# Patient Record
Sex: FEMALE | Race: ASIAN | NOT HISPANIC OR LATINO | URBAN - METROPOLITAN AREA
[De-identification: names, ages, dates, MRNs, and addresses within clinical notes are randomized per-mention and may not be internally consistent; named-entity substitution may affect disease eponyms.]

---

## 2020-08-15 ENCOUNTER — INPATIENT (INPATIENT)
Facility: HOSPITAL | Age: 35
LOS: 0 days | Discharge: HOME | End: 2020-08-16
Attending: OBSTETRICS & GYNECOLOGY | Admitting: OBSTETRICS & GYNECOLOGY

## 2020-08-15 ENCOUNTER — TRANSCRIPTION ENCOUNTER (OUTPATIENT)
Age: 35
End: 2020-08-15

## 2020-08-15 VITALS
SYSTOLIC BLOOD PRESSURE: 122 MMHG | TEMPERATURE: 99 F | DIASTOLIC BLOOD PRESSURE: 61 MMHG | HEART RATE: 86 BPM | RESPIRATION RATE: 15 BRPM

## 2020-08-15 DIAGNOSIS — O26.899 OTHER SPECIFIED PREGNANCY RELATED CONDITIONS, UNSPECIFIED TRIMESTER: ICD-10-CM

## 2020-08-15 DIAGNOSIS — Z3A.00 WEEKS OF GESTATION OF PREGNANCY NOT SPECIFIED: ICD-10-CM

## 2020-08-15 DIAGNOSIS — O42.90 PREMATURE RUPTURE OF MEMBRANES, UNSPECIFIED AS TO LENGTH OF TIME BETWEEN RUPTURE AND ONSET OF LABOR, UNSPECIFIED WEEKS OF GESTATION: ICD-10-CM

## 2020-08-15 DIAGNOSIS — A49.1 STREPTOCOCCAL INFECTION, UNSPECIFIED SITE: ICD-10-CM

## 2020-08-15 LAB
BASOPHILS # BLD AUTO: 0.04 K/UL — SIGNIFICANT CHANGE UP (ref 0–0.2)
BASOPHILS NFR BLD AUTO: 0.4 % — SIGNIFICANT CHANGE UP (ref 0–2)
BLD GP AB SCN SERPL QL: NEGATIVE — SIGNIFICANT CHANGE UP
EOSINOPHIL # BLD AUTO: 0.11 K/UL — SIGNIFICANT CHANGE UP (ref 0–0.5)
EOSINOPHIL NFR BLD AUTO: 1.1 % — SIGNIFICANT CHANGE UP (ref 0–6)
HBV SURFACE AG SER-ACNC: NEGATIVE — SIGNIFICANT CHANGE UP
HCT VFR BLD CALC: 31.7 % — LOW (ref 34.5–45)
HGB BLD-MCNC: 10.5 G/DL — LOW (ref 11.5–15.5)
IMM GRANULOCYTES NFR BLD AUTO: 0.5 % — SIGNIFICANT CHANGE UP (ref 0–1.5)
LYMPHOCYTES # BLD AUTO: 2 K/UL — SIGNIFICANT CHANGE UP (ref 1–3.3)
LYMPHOCYTES # BLD AUTO: 20.4 % — SIGNIFICANT CHANGE UP (ref 13–44)
MCHC RBC-ENTMCNC: 27.3 PG — SIGNIFICANT CHANGE UP (ref 27–34)
MCHC RBC-ENTMCNC: 33.1 % — SIGNIFICANT CHANGE UP (ref 32–36)
MCV RBC AUTO: 82.3 FL — SIGNIFICANT CHANGE UP (ref 80–100)
MONOCYTES # BLD AUTO: 0.74 K/UL — SIGNIFICANT CHANGE UP (ref 0–0.9)
MONOCYTES NFR BLD AUTO: 7.6 % — SIGNIFICANT CHANGE UP (ref 2–14)
NEUTROPHILS # BLD AUTO: 6.84 K/UL — SIGNIFICANT CHANGE UP (ref 1.8–7.4)
NEUTROPHILS NFR BLD AUTO: 70 % — SIGNIFICANT CHANGE UP (ref 43–77)
NRBC # FLD: 0 K/UL — SIGNIFICANT CHANGE UP (ref 0–0)
PLATELET # BLD AUTO: 237 K/UL — SIGNIFICANT CHANGE UP (ref 150–400)
PMV BLD: 12 FL — SIGNIFICANT CHANGE UP (ref 7–13)
RBC # BLD: 3.85 M/UL — SIGNIFICANT CHANGE UP (ref 3.8–5.2)
RBC # FLD: 13.6 % — SIGNIFICANT CHANGE UP (ref 10.3–14.5)
RH IG SCN BLD-IMP: POSITIVE — SIGNIFICANT CHANGE UP
RH IG SCN BLD-IMP: POSITIVE — SIGNIFICANT CHANGE UP
SARS-COV-2 RNA SPEC QL NAA+PROBE: SIGNIFICANT CHANGE UP
WBC # BLD: 9.78 K/UL — SIGNIFICANT CHANGE UP (ref 3.8–10.5)
WBC # FLD AUTO: 9.78 K/UL — SIGNIFICANT CHANGE UP (ref 3.8–10.5)

## 2020-08-15 RX ORDER — TETANUS TOXOID, REDUCED DIPHTHERIA TOXOID AND ACELLULAR PERTUSSIS VACCINE, ADSORBED 5; 2.5; 8; 8; 2.5 [IU]/.5ML; [IU]/.5ML; UG/.5ML; UG/.5ML; UG/.5ML
0.5 SUSPENSION INTRAMUSCULAR ONCE
Refills: 0 | Status: DISCONTINUED | OUTPATIENT
Start: 2020-08-15 | End: 2020-08-16

## 2020-08-15 RX ORDER — AER TRAVELER 0.5 G/1
1 SOLUTION RECTAL; TOPICAL EVERY 4 HOURS
Refills: 0 | Status: DISCONTINUED | OUTPATIENT
Start: 2020-08-15 | End: 2020-08-16

## 2020-08-15 RX ORDER — OXYTOCIN 10 UNIT/ML
333.33 VIAL (ML) INJECTION
Qty: 20 | Refills: 0 | Status: DISCONTINUED | OUTPATIENT
Start: 2020-08-15 | End: 2020-08-15

## 2020-08-15 RX ORDER — SODIUM CHLORIDE 9 MG/ML
3 INJECTION INTRAMUSCULAR; INTRAVENOUS; SUBCUTANEOUS EVERY 8 HOURS
Refills: 0 | Status: DISCONTINUED | OUTPATIENT
Start: 2020-08-15 | End: 2020-08-16

## 2020-08-15 RX ORDER — LANOLIN
1 OINTMENT (GRAM) TOPICAL EVERY 6 HOURS
Refills: 0 | Status: DISCONTINUED | OUTPATIENT
Start: 2020-08-15 | End: 2020-08-16

## 2020-08-15 RX ORDER — KETOROLAC TROMETHAMINE 30 MG/ML
30 SYRINGE (ML) INJECTION ONCE
Refills: 0 | Status: DISCONTINUED | OUTPATIENT
Start: 2020-08-15 | End: 2020-08-15

## 2020-08-15 RX ORDER — MAGNESIUM HYDROXIDE 400 MG/1
30 TABLET, CHEWABLE ORAL
Refills: 0 | Status: DISCONTINUED | OUTPATIENT
Start: 2020-08-15 | End: 2020-08-16

## 2020-08-15 RX ORDER — BENZOCAINE 10 %
1 GEL (GRAM) MUCOUS MEMBRANE EVERY 6 HOURS
Refills: 0 | Status: DISCONTINUED | OUTPATIENT
Start: 2020-08-15 | End: 2020-08-16

## 2020-08-15 RX ORDER — SIMETHICONE 80 MG/1
80 TABLET, CHEWABLE ORAL EVERY 4 HOURS
Refills: 0 | Status: DISCONTINUED | OUTPATIENT
Start: 2020-08-15 | End: 2020-08-16

## 2020-08-15 RX ORDER — AMPICILLIN TRIHYDRATE 250 MG
2 CAPSULE ORAL ONCE
Refills: 0 | Status: COMPLETED | OUTPATIENT
Start: 2020-08-15 | End: 2020-08-15

## 2020-08-15 RX ORDER — OXYCODONE HYDROCHLORIDE 5 MG/1
5 TABLET ORAL
Refills: 0 | Status: DISCONTINUED | OUTPATIENT
Start: 2020-08-15 | End: 2020-08-16

## 2020-08-15 RX ORDER — IBUPROFEN 200 MG
600 TABLET ORAL EVERY 6 HOURS
Refills: 0 | Status: DISCONTINUED | OUTPATIENT
Start: 2020-08-15 | End: 2020-08-16

## 2020-08-15 RX ORDER — OXYTOCIN 10 UNIT/ML
2 VIAL (ML) INJECTION
Qty: 30 | Refills: 0 | Status: DISCONTINUED | OUTPATIENT
Start: 2020-08-15 | End: 2020-08-15

## 2020-08-15 RX ORDER — AMPICILLIN TRIHYDRATE 250 MG
1 CAPSULE ORAL EVERY 4 HOURS
Refills: 0 | Status: DISCONTINUED | OUTPATIENT
Start: 2020-08-15 | End: 2020-08-15

## 2020-08-15 RX ORDER — HYDROCORTISONE 1 %
1 OINTMENT (GRAM) TOPICAL EVERY 6 HOURS
Refills: 0 | Status: DISCONTINUED | OUTPATIENT
Start: 2020-08-15 | End: 2020-08-16

## 2020-08-15 RX ORDER — ACETAMINOPHEN 500 MG
975 TABLET ORAL
Refills: 0 | Status: DISCONTINUED | OUTPATIENT
Start: 2020-08-15 | End: 2020-08-16

## 2020-08-15 RX ORDER — DIPHENHYDRAMINE HCL 50 MG
25 CAPSULE ORAL EVERY 6 HOURS
Refills: 0 | Status: DISCONTINUED | OUTPATIENT
Start: 2020-08-15 | End: 2020-08-16

## 2020-08-15 RX ORDER — OXYTOCIN 10 UNIT/ML
333.33 VIAL (ML) INJECTION
Qty: 20 | Refills: 0 | Status: COMPLETED | OUTPATIENT
Start: 2020-08-15 | End: 2020-08-15

## 2020-08-15 RX ORDER — PRAMOXINE HYDROCHLORIDE 150 MG/15G
1 AEROSOL, FOAM RECTAL EVERY 4 HOURS
Refills: 0 | Status: DISCONTINUED | OUTPATIENT
Start: 2020-08-15 | End: 2020-08-16

## 2020-08-15 RX ORDER — SENNA PLUS 8.6 MG/1
1 TABLET ORAL
Refills: 0 | Status: DISCONTINUED | OUTPATIENT
Start: 2020-08-15 | End: 2020-08-16

## 2020-08-15 RX ORDER — OXYCODONE HYDROCHLORIDE 5 MG/1
5 TABLET ORAL ONCE
Refills: 0 | Status: DISCONTINUED | OUTPATIENT
Start: 2020-08-15 | End: 2020-08-16

## 2020-08-15 RX ORDER — SODIUM CHLORIDE 9 MG/ML
1000 INJECTION, SOLUTION INTRAVENOUS
Refills: 0 | Status: DISCONTINUED | OUTPATIENT
Start: 2020-08-15 | End: 2020-08-15

## 2020-08-15 RX ORDER — IBUPROFEN 200 MG
600 TABLET ORAL EVERY 6 HOURS
Refills: 0 | Status: COMPLETED | OUTPATIENT
Start: 2020-08-15 | End: 2021-07-14

## 2020-08-15 RX ORDER — DIBUCAINE 1 %
1 OINTMENT (GRAM) RECTAL EVERY 6 HOURS
Refills: 0 | Status: DISCONTINUED | OUTPATIENT
Start: 2020-08-15 | End: 2020-08-16

## 2020-08-15 RX ORDER — LEVOTHYROXINE SODIUM 125 MCG
75 TABLET ORAL DAILY
Refills: 0 | Status: DISCONTINUED | OUTPATIENT
Start: 2020-08-15 | End: 2020-08-16

## 2020-08-15 RX ADMIN — Medication 30 MILLIGRAM(S): at 19:15

## 2020-08-15 RX ADMIN — Medication 108 GRAM(S): at 16:01

## 2020-08-15 RX ADMIN — Medication 1000 MILLIUNIT(S)/MIN: at 18:34

## 2020-08-15 RX ADMIN — Medication 30 MILLIGRAM(S): at 19:00

## 2020-08-15 RX ADMIN — SODIUM CHLORIDE 125 MILLILITER(S): 9 INJECTION, SOLUTION INTRAVENOUS at 12:00

## 2020-08-15 RX ADMIN — Medication 2 MILLIUNIT(S)/MIN: at 13:56

## 2020-08-15 RX ADMIN — Medication 216 GRAM(S): at 12:10

## 2020-08-15 NOTE — DISCHARGE NOTE OB - CARE PROVIDER_API CALL
Carlyn Yap  OBSTETRICS AND GYNECOLOGY  [address] [place] [address]  Phone: [phone]  Fax: [phone]  Follow Up Time:

## 2020-08-15 NOTE — DISCHARGE NOTE OB - MATERIALS PROVIDED
Breastfeeding Mother’s Support Group Information/Breastfeeding Guide and Packet/Guide to Postpartum Care/[facility] Hearing Screen Program/Discharge Medication Information for Patients and Families Pocket Guide/Breastfeeding Log/Shaken Baby Prevention Handout/Birth Certificate Instructions/[facility]  Screening Program/[place]  Immunization Record/Back To Sleep Handout

## 2020-08-15 NOTE — OB PROVIDER TRIAGE NOTE - NSOBPROVIDERNOTE_OBGYN_ALL_OB_FT
36 yo Tunisian female , @ 40.1 wks GA admitted for PROM and labor management    - Routine admission orders  - GBS prophylaxis  - COVID Testing  - Case Dw Dr Harper   - Patient transferred to R

## 2020-08-15 NOTE — OB PROVIDER H&P - HISTORY OF PRESENT ILLNESS
34 yo Malagasy female  @ 40.1 wks GA c/o ROM at 0001  today. Denies any VB, and reports fm's with irregular ctx's  PNC: Dr Yap   GBS is positive  NKDA

## 2020-08-15 NOTE — OB PROVIDER H&P - LABOR: CERVICAL DILATION
Patient: Hawk Germain Date: 2017   : 1952 Attending: Rio Curtis,*   65 year old male      Chief Complaint: BL leg weakness    Subjective: Reports shortness of breath and cough/phlegm last night that improved with nebulization treatments. Has been refusing guaifenesin for the last couple days.    Problem List:   Patient Active Problem List   Diagnosis   • Elevated PSA   • Prostate cancer (CMS/HCC)   • Anemia   • Hydronephrosis   • Elevated serum creatinine   • Malignant neoplasm prostate (CMS/HCC)   • Bone metastases (CMS/HCC)   • Neoplasm related pain   • Chemotherapy induced neutropenia (CMS/HCC)   • Left arm weakness   • Tobacco abuse   • Intervertebral cervical disc disorder with myelopathy, cervical region       Allergies: ALLERGIES:  No Known Allergies    Medications/Infusions: Reviewed    Review of Systems: 12 point ROS done and negative otherwise                Vital Last Value 24 Hour Range   Temperature 97.8 °F (36.6 °C) (17 044) Temp  Min: 97.8 °F (36.6 °C)  Max: 98.9 °F (37.2 °C)   Pulse 93 (17) Pulse  Min: 79  Max: 93   Respiratory 19 (17) Resp  Min: 17  Max: 19   Non-Invasive  Blood Pressure 108/61 (17) BP  Min: 101/62  Max: 108/61   Pulse Oximetry 94 % (17 1130) SpO2  Min: 90 %  Max: 99 %     Vital Today Admitted   Weight 54.5 kg (17) Weight: 53.5 kg (17 06)   Height N/A Height: 5' 10\" (177.8 cm) (17 06)   BMI N/A BMI (Calculated): 16.96 (17 06)       Intake/Output:      Intake/Output Summary (Last 24 hours) at 17 1328  Last data filed at 17 0800   Gross per 24 hour   Intake              240 ml   Output              700 ml   Net             -460 ml       Physical Exam:   GEN: NAD  HEENT: NC, AT, MMM  CV: RRR  Lungs: CTA BL, symmetrical chest expansion  Abd: soft, ND, NT, no hepatosplenomegaly  Extremities: no edema or cyanosis  Psych: AOx3, adequate mood  Neuro: CN 2-12 WNL, BL  LE weakness is same, still L leg weaker; BL UE strength unchanged    Laboratory Results:     Recent Labs  Lab 09/02/17  0905 08/31/17  0706 08/30/17  1000 08/28/17  0702 08/27/17  0623   WBC 14.7* 14.3*  --  7.5 7.4   HCT 30.5* 28.9*  --  27.3* 32.6*   HGB 8.9* 8.6*  --  8.3* 9.9*    391  --  444 509*   INR  --   --  1.2  --   --    PTT  --   --  24  --   --    SODIUM 132*  --   --  136 140   POTASSIUM 4.8  --   --  4.4 4.4   CHLORIDE 99  --   --  100 101   CO2 27  --   --  29 27   CALCIUM 7.9*  --   --  8.2* 8.1*   GLUCOSE 108*  --   --  138* 77   BUN 29*  --   --  26* 24*   CREATININE 0.37*  --   --  0.50* 0.55*   AST 40*  --   --   --  57*   GPT 33  --   --   --  11   ALKPT 662*  --   --   --  1,054*   BILIRUBIN 0.7  --   --   --  0.7   ALBUMIN 2.2*  --   --   --  2.0*   LIPA  --   --   --   --  68*   GFRNA >90  --   --  >90 >90       Imaging: No results found.      Assessment:   1. Bilateral upper and lower extremity weakness/numbness  2. Prostate CA with bone metastasis  3. Severe spinal stenosis in the upper thoracic spine secondary to #2  4. Suspect viral URI  5. Cachexia  6. Debility       Plans/Recommendations:   Had an episode of shortness of breath, cough, increased phlegm last night that improved with nebulization treatments, currently feels back to baseline, has not been taking guaifenesin. No evidence of infection, pro-calcitonin level is low.  Continue nebulization treatments, guaifenesin q.12 hours as scheduled, incentive spirometry.  Not a candidate for neurosurgery  Status post IR biopsy, cytology shows poorly differentiated malignant neoplasm with necrosis   Continue radiation treatments  Pain control  Cont decadron  Viral panel neg, d/cd isolation  PT/OT  Not a candidate for IPR will need to look at [facility] facilities. Social work following.     Greater than or equal to 4 cm

## 2020-08-15 NOTE — OB RN DELIVERY SUMMARY - NS_SEPSISRSKCALC_OBGYN_ALL_OB_FT
EOS calculated successfully. EOS Risk Factor: 0.5/1000 live births ([facility] national incidence); GA=40w1d; Temp=98.8; ROM=17.8; GBS='Positive'; Antibiotics='Broad spectrum antibiotics > 4 hrs prior to birth'

## 2020-08-15 NOTE — OB PROVIDER TRIAGE NOTE - HISTORY OF PRESENT ILLNESS
34 yo Pakistani female  @ 40.1 wks GA c/o ROM at 0001  today. Denies any VB, and reports fm's with irregular ctx's  PNC: Dr Yap   GBS is positive  NKDA

## 2020-08-15 NOTE — OB PROVIDER IHI INDUCTION/AUGMENTATION NOTE - NS_CHECKALL_OBGYN_ALL_OB
H&P was completed/Induction / Augmentation was discussed/FHR was reviewed/Order was written/Contractions pattern was reviewed

## 2020-08-15 NOTE — DISCHARGE NOTE OB - NS OB DC PAIN RELIEF
Warm water sitz bath for relief from hemorrhoids/Tylenol for minor pain as directed/If you have episitomy or tear repair, use anesthetic spray or cream as directed by your healthcare provider for relief of discomfort/Other pain medication as prescribed and directed/If you have hemorrhoids, use cold compresses, ointments and pain medications for relief of discomfort as directed and/or prescribed by your healthcare provider/If you have episitomy or tear repair, use warm water sitz bath for relief of discomfort

## 2020-08-15 NOTE — DISCHARGE NOTE OB - PATIENT PORTAL LINK FT
You can access the FollowMyHealth Patient Portal offered by [facility] by registering at the following website: [email or web address]. By joining [employer]’s FollowMyHealth portal, you will also be able to view your health information using other applications (apps) compatible with our system.

## 2020-08-15 NOTE — OB PROVIDER TRIAGE NOTE - NSHPPHYSICALEXAM_GEN_ALL_CORE
A/O x 3  NAD  Vital Signs Last 24 Hrs  T(C): 37.1 (15 Aug 2020 11:19), Max: 37.1 (15 Aug 2020 11:19)  T(F): 98.8 (15 Aug 2020 11:19), Max: 98.8 (15 Aug 2020 11:19)  HR: 86 (15 Aug 2020 11:30) (86 - 86)  BP: 122/61 (15 Aug 2020 11:30) (122/61 - 122/61)  BP(mean): --  RR: 15 (15 Aug 2020 11:19) (15 - 15)  SpO2: --  Heart: RRR  Lungs: BCTA  Abdomen is soft NT and gravid with no ctx palpable  SVE: Pooling of clear fluid on chucks visual with Nitrazine positive  4/70/-2 with ROM confirmed  TAS: VTX presentation  EFW by Palpation 3200 grams  FHR:  TOCO:

## 2020-08-15 NOTE — OB PROVIDER DELIVERY SUMMARY - NSPROVIDERDELIVERYNOTE_OBGYN_ALL_OB_FT
of a live baby girl.  Placenta & membranes delivered spontaneously & intact.  Cervix & vagina inspected- first degree laceration noted.  Repaired with chromic catgut. Hemostasis assured.  Mother & baby in stable condition.

## 2020-08-15 NOTE — OB PROVIDER H&P - ASSESSMENT
34 yo  @ 40.1 wks GA admitted for PROM and labor management    - Routine admission orders  - GBS prophylaxis  - COVID Testing  - Case Dw Dr Harper   - Patient transferred to R

## 2020-08-15 NOTE — OB PROVIDER H&P - NSHPPHYSICALEXAM_GEN_ALL_CORE
A/O x 3  NAD  Vital Signs Last 24 Hrs  T(C): 37.1 (15 Aug 2020 11:19), Max: 37.1 (15 Aug 2020 11:19)  T(F): 98.8 (15 Aug 2020 11:19), Max: 98.8 (15 Aug 2020 11:19)  HR: 86 (15 Aug 2020 11:30) (86 - 86)  BP: 122/61 (15 Aug 2020 11:30) (122/61 - 122/61)  BP(mean): --  RR: 15 (15 Aug 2020 11:19) (15 - 15)  SpO2: --  Heart: RRR  Lungs: BCTA  Abdomen is soft NT and gravid with no ctx palpable  SVE: Pooling of clear fluid on chucks visual with Nitrazine positive  4/70/-2 with ROM confirmed  TAS: VTX presentation  EFW by Palpation 3200 grams  FHR: Cat 1- Non reactive  (140 baseline, mod variability, no accels, no decels)  TOCO: NO CTX (NST not completed as patient was transferred to R prior to full 20 minute NST)

## 2020-08-16 VITALS
DIASTOLIC BLOOD PRESSURE: 67 MMHG | HEART RATE: 67 BPM | OXYGEN SATURATION: 100 % | SYSTOLIC BLOOD PRESSURE: 101 MMHG | RESPIRATION RATE: 18 BRPM | TEMPERATURE: 98 F

## 2020-08-16 LAB
RUBV IGG SER-ACNC: 13.7 INDEX — SIGNIFICANT CHANGE UP
RUBV IGG SER-IMP: POSITIVE — SIGNIFICANT CHANGE UP
T PALLIDUM AB TITR SER: NEGATIVE — SIGNIFICANT CHANGE UP

## 2020-08-16 RX ORDER — LEVOTHYROXINE SODIUM 125 MCG
1 TABLET ORAL
Qty: 0 | Refills: 0 | DISCHARGE

## 2020-08-16 RX ADMIN — Medication 600 MILLIGRAM(S): at 14:24

## 2020-08-16 RX ADMIN — Medication 975 MILLIGRAM(S): at 11:33

## 2020-08-16 RX ADMIN — Medication 600 MILLIGRAM(S): at 09:30

## 2020-08-16 RX ADMIN — SODIUM CHLORIDE 3 MILLILITER(S): 9 INJECTION INTRAMUSCULAR; INTRAVENOUS; SUBCUTANEOUS at 05:32

## 2020-08-16 RX ADMIN — Medication 600 MILLIGRAM(S): at 03:25

## 2020-08-16 RX ADMIN — Medication 600 MILLIGRAM(S): at 20:37

## 2020-08-16 RX ADMIN — SODIUM CHLORIDE 3 MILLILITER(S): 9 INJECTION INTRAMUSCULAR; INTRAVENOUS; SUBCUTANEOUS at 14:00

## 2020-08-16 RX ADMIN — Medication 600 MILLIGRAM(S): at 02:25

## 2020-08-16 RX ADMIN — Medication 975 MILLIGRAM(S): at 12:30

## 2020-08-16 RX ADMIN — SODIUM CHLORIDE 3 MILLILITER(S): 9 INJECTION INTRAMUSCULAR; INTRAVENOUS; SUBCUTANEOUS at 00:04

## 2020-08-16 RX ADMIN — Medication 600 MILLIGRAM(S): at 15:20

## 2020-08-16 RX ADMIN — Medication 600 MILLIGRAM(S): at 08:33

## 2020-08-16 RX ADMIN — Medication 600 MILLIGRAM(S): at 19:37

## 2020-08-16 RX ADMIN — Medication 1 TABLET(S): at 08:33

## 2020-08-16 RX ADMIN — Medication 75 MICROGRAM(S): at 05:31

## 2020-08-16 NOTE — PROGRESS NOTE ADULT - SUBJECTIVE AND OBJECTIVE BOX
S: Patient doing well. Minimal lochia. Pain controlled.  Post Partum day : 1  O: Vital Signs Last 24 Hrs  T(C): 36.4 (16 Aug 2020 05:37), Max: 37.1 (15 Aug 2020 11:19)  T(F): 97.6 (16 Aug 2020 05:37), Max: 98.8 (15 Aug 2020 11:19)  HR: 80 (16 Aug 2020 05:37) (62 - 132)  BP: 107/69 (16 Aug 2020 05:37) (80/46 - 137/56)  BP(mean): --  RR: 18 (16 Aug 2020 05:37) (15 - 18)  SpO2: 100% (16 Aug 2020 05:37) (88% - 100%)    Gen: No acute distress  Abd: soft, NT,  fundus firm below umbilicus  Lochia: Normal  Ext: no tenderness    Labs:                        10.5   9.78  )-----------( 237      ( 15 Aug 2020 12:00 )             31.7       A: 35y PPD # 1 s/p  doing well.    Plan: Discharge home this evening

## 2022-03-07 VITALS — WEIGHT: 210 LBS

## 2022-03-08 ENCOUNTER — OUTPATIENT (OUTPATIENT)
Dept: OUTPATIENT SERVICES | Facility: HOSPITAL | Age: 37
LOS: 1 days | Discharge: HOME | End: 2022-03-08

## 2022-03-08 ENCOUNTER — TRANSCRIPTION ENCOUNTER (OUTPATIENT)
Age: 37
End: 2022-03-08

## 2022-03-08 ENCOUNTER — INPATIENT (INPATIENT)
Facility: HOSPITAL | Age: 37
LOS: 1 days | Discharge: HOME | End: 2022-03-10
Attending: OBSTETRICS & GYNECOLOGY | Admitting: OBSTETRICS & GYNECOLOGY

## 2022-03-08 VITALS — DIASTOLIC BLOOD PRESSURE: 70 MMHG | HEART RATE: 84 BPM | SYSTOLIC BLOOD PRESSURE: 117 MMHG

## 2022-03-08 VITALS
DIASTOLIC BLOOD PRESSURE: 71 MMHG | SYSTOLIC BLOOD PRESSURE: 119 MMHG | HEART RATE: 84 BPM | RESPIRATION RATE: 14 BRPM | TEMPERATURE: 98 F

## 2022-03-08 VITALS — HEART RATE: 93 BPM | DIASTOLIC BLOOD PRESSURE: 79 MMHG | SYSTOLIC BLOOD PRESSURE: 120 MMHG

## 2022-03-08 DIAGNOSIS — Z3A.00 WEEKS OF GESTATION OF PREGNANCY NOT SPECIFIED: ICD-10-CM

## 2022-03-08 DIAGNOSIS — O26.899 OTHER SPECIFIED PREGNANCY RELATED CONDITIONS, UNSPECIFIED TRIMESTER: ICD-10-CM

## 2022-03-08 LAB
BASOPHILS # BLD AUTO: 0.03 K/UL — SIGNIFICANT CHANGE UP (ref 0–0.2)
BASOPHILS NFR BLD AUTO: 0.2 % — SIGNIFICANT CHANGE UP (ref 0–2)
BLD GP AB SCN SERPL QL: NEGATIVE — SIGNIFICANT CHANGE UP
COVID-19 SPIKE DOMAIN AB INTERP: POSITIVE
COVID-19 SPIKE DOMAIN ANTIBODY RESULT: >250 U/ML — HIGH
EOSINOPHIL # BLD AUTO: 0.04 K/UL — SIGNIFICANT CHANGE UP (ref 0–0.5)
EOSINOPHIL NFR BLD AUTO: 0.3 % — SIGNIFICANT CHANGE UP (ref 0–6)
HCT VFR BLD CALC: 33.8 % — LOW (ref 34.5–45)
HGB BLD-MCNC: 11 G/DL — LOW (ref 11.5–15.5)
IANC: 9.79 K/UL — HIGH (ref 1.5–8.5)
IMM GRANULOCYTES NFR BLD AUTO: 0.5 % — SIGNIFICANT CHANGE UP (ref 0–1.5)
LYMPHOCYTES # BLD AUTO: 1.7 K/UL — SIGNIFICANT CHANGE UP (ref 1–3.3)
LYMPHOCYTES # BLD AUTO: 13.6 % — SIGNIFICANT CHANGE UP (ref 13–44)
MCHC RBC-ENTMCNC: 26.8 PG — LOW (ref 27–34)
MCHC RBC-ENTMCNC: 32.5 GM/DL — SIGNIFICANT CHANGE UP (ref 32–36)
MCV RBC AUTO: 82.4 FL — SIGNIFICANT CHANGE UP (ref 80–100)
MONOCYTES # BLD AUTO: 0.87 K/UL — SIGNIFICANT CHANGE UP (ref 0–0.9)
MONOCYTES NFR BLD AUTO: 7 % — SIGNIFICANT CHANGE UP (ref 2–14)
NEUTROPHILS # BLD AUTO: 9.79 K/UL — HIGH (ref 1.8–7.4)
NEUTROPHILS NFR BLD AUTO: 78.4 % — HIGH (ref 43–77)
NRBC # BLD: 0 /100 WBCS — SIGNIFICANT CHANGE UP
NRBC # FLD: 0 K/UL — SIGNIFICANT CHANGE UP
PLATELET # BLD AUTO: 230 K/UL — SIGNIFICANT CHANGE UP (ref 150–400)
RBC # BLD: 4.1 M/UL — SIGNIFICANT CHANGE UP (ref 3.8–5.2)
RBC # FLD: 14.1 % — SIGNIFICANT CHANGE UP (ref 10.3–14.5)
RH IG SCN BLD-IMP: POSITIVE — SIGNIFICANT CHANGE UP
SARS-COV-2 IGG+IGM SERPL QL IA: >250 U/ML — HIGH
SARS-COV-2 IGG+IGM SERPL QL IA: POSITIVE
SARS-COV-2 RNA SPEC QL NAA+PROBE: SIGNIFICANT CHANGE UP
WBC # BLD: 12.49 K/UL — HIGH (ref 3.8–10.5)
WBC # FLD AUTO: 12.49 K/UL — HIGH (ref 3.8–10.5)

## 2022-03-08 RX ORDER — AMPICILLIN TRIHYDRATE 250 MG
1 CAPSULE ORAL EVERY 4 HOURS
Refills: 0 | Status: DISCONTINUED | OUTPATIENT
Start: 2022-03-08 | End: 2022-03-09

## 2022-03-08 RX ORDER — BENZOCAINE 10 %
1 GEL (GRAM) MUCOUS MEMBRANE EVERY 6 HOURS
Refills: 0 | Status: DISCONTINUED | OUTPATIENT
Start: 2022-03-08 | End: 2022-03-10

## 2022-03-08 RX ORDER — MAGNESIUM HYDROXIDE 400 MG/1
30 TABLET, CHEWABLE ORAL
Refills: 0 | Status: DISCONTINUED | OUTPATIENT
Start: 2022-03-08 | End: 2022-03-10

## 2022-03-08 RX ORDER — AER TRAVELER 0.5 G/1
1 SOLUTION RECTAL; TOPICAL EVERY 4 HOURS
Refills: 0 | Status: DISCONTINUED | OUTPATIENT
Start: 2022-03-08 | End: 2022-03-10

## 2022-03-08 RX ORDER — PRAMOXINE HYDROCHLORIDE 150 MG/15G
1 AEROSOL, FOAM RECTAL EVERY 4 HOURS
Refills: 0 | Status: DISCONTINUED | OUTPATIENT
Start: 2022-03-08 | End: 2022-03-10

## 2022-03-08 RX ORDER — SODIUM CHLORIDE 9 MG/ML
1000 INJECTION, SOLUTION INTRAVENOUS
Refills: 0 | Status: DISCONTINUED | OUTPATIENT
Start: 2022-03-08 | End: 2022-03-09

## 2022-03-08 RX ORDER — ACETAMINOPHEN 500 MG
975 TABLET ORAL
Refills: 0 | Status: DISCONTINUED | OUTPATIENT
Start: 2022-03-08 | End: 2022-03-10

## 2022-03-08 RX ORDER — SIMETHICONE 80 MG/1
80 TABLET, CHEWABLE ORAL EVERY 4 HOURS
Refills: 0 | Status: DISCONTINUED | OUTPATIENT
Start: 2022-03-08 | End: 2022-03-10

## 2022-03-08 RX ORDER — IBUPROFEN 200 MG
600 TABLET ORAL EVERY 6 HOURS
Refills: 0 | Status: COMPLETED | OUTPATIENT
Start: 2022-03-08 | End: 2023-02-04

## 2022-03-08 RX ORDER — KETOROLAC TROMETHAMINE 30 MG/ML
30 SYRINGE (ML) INJECTION ONCE
Refills: 0 | Status: DISCONTINUED | OUTPATIENT
Start: 2022-03-08 | End: 2022-03-08

## 2022-03-08 RX ORDER — INFLUENZA VIRUS VACCINE 15; 15; 15; 15 UG/.5ML; UG/.5ML; UG/.5ML; UG/.5ML
0.5 SUSPENSION INTRAMUSCULAR ONCE
Refills: 0 | Status: COMPLETED | OUTPATIENT
Start: 2022-03-08 | End: 2022-03-08

## 2022-03-08 RX ORDER — OXYTOCIN 10 UNIT/ML
333.33 VIAL (ML) INJECTION
Qty: 20 | Refills: 0 | Status: DISCONTINUED | OUTPATIENT
Start: 2022-03-08 | End: 2022-03-09

## 2022-03-08 RX ORDER — TETANUS TOXOID, REDUCED DIPHTHERIA TOXOID AND ACELLULAR PERTUSSIS VACCINE, ADSORBED 5; 2.5; 8; 8; 2.5 [IU]/.5ML; [IU]/.5ML; UG/.5ML; UG/.5ML; UG/.5ML
0.5 SUSPENSION INTRAMUSCULAR ONCE
Refills: 0 | Status: DISCONTINUED | OUTPATIENT
Start: 2022-03-08 | End: 2022-03-10

## 2022-03-08 RX ORDER — OXYTOCIN 10 UNIT/ML
2 VIAL (ML) INJECTION
Qty: 30 | Refills: 0 | Status: DISCONTINUED | OUTPATIENT
Start: 2022-03-08 | End: 2022-03-09

## 2022-03-08 RX ORDER — DIBUCAINE 1 %
1 OINTMENT (GRAM) RECTAL EVERY 6 HOURS
Refills: 0 | Status: DISCONTINUED | OUTPATIENT
Start: 2022-03-08 | End: 2022-03-10

## 2022-03-08 RX ORDER — DIPHENHYDRAMINE HCL 50 MG
25 CAPSULE ORAL EVERY 6 HOURS
Refills: 0 | Status: DISCONTINUED | OUTPATIENT
Start: 2022-03-08 | End: 2022-03-10

## 2022-03-08 RX ORDER — LANOLIN
1 OINTMENT (GRAM) TOPICAL EVERY 6 HOURS
Refills: 0 | Status: DISCONTINUED | OUTPATIENT
Start: 2022-03-08 | End: 2022-03-10

## 2022-03-08 RX ORDER — HYDROCORTISONE 1 %
1 OINTMENT (GRAM) TOPICAL EVERY 6 HOURS
Refills: 0 | Status: DISCONTINUED | OUTPATIENT
Start: 2022-03-08 | End: 2022-03-10

## 2022-03-08 RX ORDER — AMPICILLIN TRIHYDRATE 250 MG
2 CAPSULE ORAL ONCE
Refills: 0 | Status: COMPLETED | OUTPATIENT
Start: 2022-03-08 | End: 2022-03-08

## 2022-03-08 RX ORDER — SODIUM CHLORIDE 9 MG/ML
3 INJECTION INTRAMUSCULAR; INTRAVENOUS; SUBCUTANEOUS EVERY 8 HOURS
Refills: 0 | Status: DISCONTINUED | OUTPATIENT
Start: 2022-03-08 | End: 2022-03-10

## 2022-03-08 RX ADMIN — Medication 108 GRAM(S): at 19:16

## 2022-03-08 RX ADMIN — SODIUM CHLORIDE 125 MILLILITER(S): 9 INJECTION, SOLUTION INTRAVENOUS at 15:00

## 2022-03-08 RX ADMIN — Medication 1000 MILLIUNIT(S)/MIN: at 22:10

## 2022-03-08 RX ADMIN — Medication 30 MILLIGRAM(S): at 22:20

## 2022-03-08 RX ADMIN — Medication 216 GRAM(S): at 15:32

## 2022-03-08 RX ADMIN — Medication 2 MILLIUNIT(S)/MIN: at 19:59

## 2022-03-08 NOTE — OB PROVIDER H&P - NSHPPHYSICALEXAM_GEN_ALL_CORE
Vital Signs Last 24 Hrs  T(C): 36.7 (08 Mar 2022 09:16), Max: 36.7 (08 Mar 2022 09:16)  T(F): 98.1 (08 Mar 2022 09:16), Max: 98.1 (08 Mar 2022 09:16)  HR: 84 (08 Mar 2022 10:08) (74 - 84)  BP: 117/70 (08 Mar 2022 10:08) (117/70 - 122/70)  BP(mean): --  RR: 14 (08 Mar 2022 09:16) (14 - 14)  SpO2: --  TAS: cephalic  FHR and TOCO: applied  SVE 5/70/-3

## 2022-03-08 NOTE — OB PROVIDER DELIVERY SUMMARY - NSSELHIDDEN_OBGYN_ALL_OB_FT
[NS_DeliveryAttending1_OBGYN_ALL_OB_FT:[id number]=],[NS_DeliveryAssist1_OBGYN_ALL_OB_FT:[id number]=]

## 2022-03-08 NOTE — DISCHARGE NOTE OB - NS MD DC FALL RISK RISK
For information on Fall & Injury Prevention, visit: [email or web address] OR  [email or web address] OR  https://www.cdc.gov/steadi/patient.html

## 2022-03-08 NOTE — OB PROVIDER TRIAGE NOTE - NSHPPHYSICALEXAM_GEN_ALL_CORE
pt seen ad examined    placed on [facility]   ICU Vital Signs Last 24 Hrs  T(C): 36.7 (08 Mar 2022 09:16), Max: 36.7 (08 Mar 2022 09:16)  T(F): 98.1 (08 Mar 2022 09:16), Max: 98.1 (08 Mar 2022 09:16)  HR: 74 (08 Mar 2022 09:26) (74 - 84)  BP: 119/71 (08 Mar 2022 09:26) (119/71 - 119/71)  BP(mean): --  ABP: --  ABP(mean): --  RR: 14 (08 Mar 2022 09:16) (14 - 14)  SpO2: --  pt in NAD   lungs clear   heart s1 s2  abd soft    VE pt seen ad examined    placed on [facility]   ICU Vital Signs Last 24 Hrs  T(C): 36.7 (08 Mar 2022 09:16), Max: 36.7 (08 Mar 2022 09:16)  T(F): 98.1 (08 Mar 2022 09:16), Max: 98.1 (08 Mar 2022 09:16)  HR: 74 (08 Mar 2022 09:26) (74 - 84)  BP: 119/71 (08 Mar 2022 09:26) (119/71 - 119/71)  BP(mean): --  ABP: --  ABP(mean): --  RR: 14 (08 Mar 2022 09:16) (14 - 14)  SpO2: --  pt in NAD   lungs clear   heart s1 s2  abd soft    VE3/60/-3   NST reactive  with contractions 2-3 minutes  irregular   scan vtx aafi 8.7 bpp 8/8

## 2022-03-08 NOTE — DISCHARGE NOTE OB - CARE PROVIDER_API CALL
Lise Harper)  Obstetrics and Gynecology  [address] [place] [address]  Phone: [phone]  Fax: [phone]  Follow Up Time:

## 2022-03-08 NOTE — OB RN TRIAGE NOTE - FALL HARM RISK - UNIVERSAL INTERVENTIONS
Bed in lowest position, wheels locked, appropriate side rails in place/Call bell, personal items and telephone in reach/Instruct patient to call for assistance before getting out of bed or chair/Non-slip footwear when patient is out of bed/[place] to call system/Physically safe environment - no spills, clutter or unnecessary equipment/Purposeful Proactive Rounding/Room/bathroom lighting operational, light cord in reach

## 2022-03-08 NOTE — OB PROVIDER TRIAGE NOTE - ADDITIONAL INSTRUCTIONS
case d/w Dr Mendes    NST reactive with mild irregular contractions    BPP 8/8    cleared for discharge by Dr mendes    S/S of labor reviewed with patient    FM counts reviewed with patient    office follow up as scheduled

## 2022-03-08 NOTE — OB RN PATIENT PROFILE - FALL HARM RISK - UNIVERSAL INTERVENTIONS
Bed in lowest position, wheels locked, appropriate side rails in place/Call bell, personal items and telephone in reach/Instruct patient to call for assistance before getting out of bed or chair/Non-slip footwear when patient is out of bed/[place] to call system/Physically safe environment - no spills, clutter or unnecessary equipment/Purposeful Proactive Rounding/Room/bathroom lighting operational, light cord in reach

## 2022-03-08 NOTE — DISCHARGE NOTE OB - CARE PLAN
Principal Discharge DX:	 (spontaneous vaginal delivery)  Assessment and plan of treatment:	F/U 6 weeks   1

## 2022-03-08 NOTE — OB PROVIDER H&P - PROBLEM SELECTOR PLAN 1
Evidence of labor  - direct admit to labor and delivery  - to discuss with   - requesting for epidural placement   - Ampicillin ordered for GBS positive status  - COVID PCR to obtained

## 2022-03-08 NOTE — OB RN PATIENT PROFILE - HAVE YOU HAD A FIRST COVID-19 BOOSTER?
Received report that we are awaiting orders for HFNC. Seen patient assigned to Dr. Roman, page sent to MD at this time:    [address] Curtis: on 15 lpm oxymask. RT assessed and would like orders for high flow. thanks   No

## 2022-03-08 NOTE — DISCHARGE NOTE OB - PATIENT PORTAL LINK FT
You can access the FollowMyHealth Patient Portal offered by [facility] by registering at the following website: [email or web address]. By joining [employer]’s FollowMyHealth portal, you will also be able to view your health information using other applications (apps) compatible with our system.

## 2022-03-08 NOTE — DISCHARGE NOTE OB - MEDICATION SUMMARY - MEDICATIONS TO TAKE
I will START or STAY ON the medications listed below when I get home from the hospital:    acetaminophen 325 mg oral tablet  -- 3 tab(s) by mouth every 6 hours, As Needed  -- Indication: For  (spontaneous vaginal delivery)    ibuprofen 600 mg oral tablet  -- 1 tab(s) by mouth every 6 hours, As Needed  -- Indication: For  (spontaneous vaginal delivery)    levothyroxine 50 mcg (0.05 mg) oral tablet  -- 1 tab(s) by mouth once a day  -- Indication: For hypothyroidism

## 2022-03-08 NOTE — DISCHARGE NOTE OB - MATERIALS PROVIDED
Vaccinations/[facility]  Screening Program/  Immunization Record/Breastfeeding Log/Guide to Postpartum Care/[facility] Hearing Screen Program/Shaken Baby Prevention Handout/Birth Certificate Instructions

## 2022-03-08 NOTE — OB PROVIDER DELIVERY SUMMARY - NSPROVIDERDELIVERYNOTE_OBGYN_ALL_OB_FT
Spontaneous vaginal delivery of liveborn infant from OA position. Nuchal x2 noted - delivered through. Head, shoulders, and body delivered easily. Infant passed to mother. Cord was clamped and cut. Placenta delivered spontaneously, noted to be intact. Fundal massage was given and uterine fundus was found to be firm. Vaginal exam revealed intact cervix, sulci, vaginal walls. Perineum with first degree laceration, repaired in standard fashion with chromic suture. Excellent hemostasis was noted. Patient stable. Count correct x 2.

## 2022-03-08 NOTE — OB PROVIDER TRIAGE NOTE - NSOBPROVIDERNOTE_OBGYN_ALL_OB_FT
36 year old female P3 at 38 weeks gestation 36 year old female P3 at 38 weeks gestation early labor   case d/w Dr Mendes    NST reactive with mild irregular contractions    BPP 8/8    cleared for discharge by Dr mendes    S/S of labor reviewed with patient    FM counts reviewed with patient    office follow up as scheduled     WANDY DOBBINS

## 2022-03-08 NOTE — OB PROVIDER TRIAGE NOTE - HISTORY OF PRESENT ILLNESS
36 year old [id number] at 38 weeks gestation who presents with contractions every 3 minutes    denied any LOF VB  feels GFM      denied any  ap issues denied any fetal issues  stated positive GBS     medhx denied  surghx denied   meds pnvqd   allergies nkda  OB hx hypothyroidism   meds synthroid 50 mcgs  qd  allergies nkda

## 2022-03-09 PROBLEM — E03.9 HYPOTHYROIDISM, UNSPECIFIED: Chronic | Status: ACTIVE | Noted: 2020-08-15

## 2022-03-09 LAB — T PALLIDUM AB TITR SER: NEGATIVE — SIGNIFICANT CHANGE UP

## 2022-03-09 RX ORDER — IBUPROFEN 200 MG
600 TABLET ORAL EVERY 6 HOURS
Refills: 0 | Status: DISCONTINUED | OUTPATIENT
Start: 2022-03-09 | End: 2022-03-10

## 2022-03-09 RX ORDER — LEVOTHYROXINE SODIUM 125 MCG
50 TABLET ORAL DAILY
Refills: 0 | Status: DISCONTINUED | OUTPATIENT
Start: 2022-03-09 | End: 2022-03-10

## 2022-03-09 RX ADMIN — Medication 600 MILLIGRAM(S): at 07:00

## 2022-03-09 RX ADMIN — Medication 1 TABLET(S): at 11:38

## 2022-03-09 RX ADMIN — SODIUM CHLORIDE 3 MILLILITER(S): 9 INJECTION INTRAMUSCULAR; INTRAVENOUS; SUBCUTANEOUS at 22:49

## 2022-03-09 RX ADMIN — SODIUM CHLORIDE 3 MILLILITER(S): 9 INJECTION INTRAMUSCULAR; INTRAVENOUS; SUBCUTANEOUS at 07:04

## 2022-03-09 RX ADMIN — Medication 600 MILLIGRAM(S): at 06:15

## 2022-03-09 RX ADMIN — Medication 600 MILLIGRAM(S): at 20:22

## 2022-03-09 RX ADMIN — Medication 975 MILLIGRAM(S): at 15:59

## 2022-03-09 RX ADMIN — Medication 975 MILLIGRAM(S): at 15:13

## 2022-03-09 RX ADMIN — Medication 600 MILLIGRAM(S): at 12:59

## 2022-03-09 RX ADMIN — Medication 600 MILLIGRAM(S): at 21:00

## 2022-03-09 RX ADMIN — Medication 50 MICROGRAM(S): at 06:12

## 2022-03-09 RX ADMIN — Medication 600 MILLIGRAM(S): at 11:38

## 2022-03-09 RX ADMIN — Medication 30 MILLIGRAM(S): at 00:30

## 2022-03-09 NOTE — OB RN DELIVERY SUMMARY - NS_SEPSISRSKCALC_OBGYN_ALL_OB_FT
GBS status in the 'Prenatal Lab tests/results section' on the OB RN Patient Profile must be documented.   EOS calculated successfully. EOS Risk Factor: 0.5/1000 live births ([facility] national incidence); GA=38w2d; Temp=98.96; ROM=2.1; GBS='Positive'; Antibiotics='GBS specific antibiotics > 2 hrs prior to birth'

## 2022-03-09 NOTE — OB RN DELIVERY SUMMARY - NSSELHIDDEN_OBGYN_ALL_OB_FT
[NS_DeliveryAttending1_OBGYN_ALL_OB_FT:[id number]=],[NS_DeliveryAssist1_OBGYN_ALL_OB_FT:[id number]=],[NS_DeliveryRN_OBGYN_ALL_OB_FT:[id number]=]

## 2022-03-10 VITALS
DIASTOLIC BLOOD PRESSURE: 79 MMHG | TEMPERATURE: 98 F | HEART RATE: 84 BPM | SYSTOLIC BLOOD PRESSURE: 109 MMHG | RESPIRATION RATE: 16 BRPM | OXYGEN SATURATION: 98 %

## 2022-03-10 RX ORDER — IBUPROFEN 200 MG
1 TABLET ORAL
Qty: 0 | Refills: 0 | DISCHARGE
Start: 2022-03-10

## 2022-03-10 RX ORDER — LEVOTHYROXINE SODIUM 125 MCG
1 TABLET ORAL
Qty: 0 | Refills: 0 | DISCHARGE

## 2022-03-10 RX ORDER — ACETAMINOPHEN 500 MG
3 TABLET ORAL
Qty: 0 | Refills: 0 | DISCHARGE
Start: 2022-03-10

## 2022-03-10 RX ORDER — LEVOTHYROXINE SODIUM 125 MCG
1 TABLET ORAL
Qty: 0 | Refills: 0 | DISCHARGE
Start: 2022-03-10

## 2022-03-10 RX ADMIN — Medication 1 TABLET(S): at 12:40

## 2022-03-10 RX ADMIN — Medication 975 MILLIGRAM(S): at 11:00

## 2022-03-10 RX ADMIN — Medication 50 MICROGRAM(S): at 06:09

## 2022-03-10 RX ADMIN — Medication 600 MILLIGRAM(S): at 06:09

## 2022-03-10 RX ADMIN — Medication 600 MILLIGRAM(S): at 06:55

## 2022-03-10 RX ADMIN — Medication 600 MILLIGRAM(S): at 13:40

## 2022-03-10 RX ADMIN — Medication 975 MILLIGRAM(S): at 10:05

## 2022-03-10 RX ADMIN — Medication 600 MILLIGRAM(S): at 12:40

## 2022-03-10 NOTE — PROGRESS NOTE ADULT - SUBJECTIVE AND OBJECTIVE BOX
S: Patient doing well. Minimal lochia. Pain controlled.  Post Partum day : 1  O: Vital Signs Last 24 Hrs  T(C): 36.7 (09 Mar 2022 05:50), Max: 37.2 (08 Mar 2022 19:17)  T(F): 98.1 (09 Mar 2022 05:50), Max: 98.96 (08 Mar 2022 19:17)  HR: 86 (09 Mar 2022 05:50) (74 - 108)  BP: 94/61 (09 Mar 2022 05:50) (93/55 - 130/60)  BP(mean): --  RR: 18 (09 Mar 2022 05:50) (14 - 18)  SpO2: 98% (09 Mar 2022 05:50) (88% - 100%)    Gen: No acute distress  Abd: soft, NT,  fundus firm below umbilicus  Lochia: Normal  Ext: no tenderness    Labs:                        11.0   12.49 )-----------( 230      ( 08 Mar 2022 15:18 )             33.8       A: 36y PPD # 1 s/p  doing well.    Plan: Routine care.
Anesthesia Post-op Note    POD#1 S/P vaginal delivery    Patient is doing well.  OOBAA. Tolerating clears.  Pain is tolerable.  No residual anesthetic issues or complications noted.  
S: Patient doing well. Minimal lochia. Pain controlled.  Post Partum day : 2  O: Vital Signs Last 24 Hrs  T(C): 36.6 (10 Mar 2022 06:08), Max: 36.6 (10 Mar 2022 06:08)  T(F): 97.9 (10 Mar 2022 06:08), Max: 97.9 (10 Mar 2022 06:08)  HR: 89 (10 Mar 2022 06:08) (78 - 89)  BP: 107/64 (10 Mar 2022 06:08) (103/62 - 120/68)  BP(mean): --  RR: 17 (10 Mar 2022 06:08) (16 - 17)  SpO2: 97% (10 Mar 2022 06:08) (97% - 100%)    Gen: No acute distress  Abd: soft, NT,  fundus firm below umbilicus  Lochia:Normal  Ext: no tenderness    Labs:                        11.0   12.49 )-----------( 230      ( 08 Mar 2022 15:18 )             33.8       A: 36y PPD# s/p  doing well.    Plan: Discharge home

## 2022-03-11 DIAGNOSIS — N92.5 OTHER SPECIFIED IRREGULAR MENSTRUATION: ICD-10-CM

## 2022-03-11 DIAGNOSIS — E03.9 HYPOTHYROIDISM, UNSPECIFIED: ICD-10-CM

## 2022-03-11 DIAGNOSIS — Z34.90 ENCOUNTER FOR SUPERVISION OF NORMAL PREGNANCY, UNSPECIFIED, UNSPECIFIED TRIMESTER: ICD-10-CM

## 2022-03-11 DIAGNOSIS — N91.1 SECONDARY AMENORRHEA: ICD-10-CM

## 2022-03-11 DIAGNOSIS — Z78.9 OTHER SPECIFIED HEALTH STATUS: ICD-10-CM

## 2022-03-11 RX ORDER — LEVOTHYROXINE SODIUM 0.05 MG/1
50 TABLET ORAL DAILY
Refills: 0 | Status: ACTIVE | COMMUNITY

## 2022-03-14 ENCOUNTER — APPOINTMENT (OUTPATIENT)
Dept: OBGYN | Facility: CLINIC | Age: 37
End: 2022-03-14

## 2022-03-31 NOTE — OB PROVIDER H&P - NSANTENATALSTERA_OBGYN_ALL_OB
Assessment /Plan     For exam results, see Encounter Report.    Encounter for fitting or adjustment of spectacles or contact lenses      Contact lens exam done, see exam of same date for documentation.                    Not applicable as gestational age is greater than or equal to 34 weeks.

## 2022-09-26 NOTE — OB PROVIDER H&P - PROBLEM/PLAN-2
I reviewed chart and called mother, Rain Huerta at [phone]  I noted that Harper Wick Is scheduled for surgery on 10/11/22   I left a voice message and requested a return call   I also sent a test message   I will follow up in a few weeks on progress      JUNIOR      1 well visit  11/12/21 follow up 1 year      2 Dr Quezada 6/21/22  surgery 10/11/22 ?  Need H&P         3 echo 12/23/22 done [facility]     4 audiology 3/9/22 failed follow up 6/13/22 10 am       [facility] seen 7/18/22  6 month follow up 1/20/23        C&Y involved Buddy Bryant
DISPLAY PLAN FREE TEXT

## 2023-02-18 NOTE — OB RN PATIENT PROFILE - TEACHING/LEARNING CULTURAL CONSIDERATIONS
Called to delivery room to assist this mother and baby with positioning and latch. Baby with excellent tongue thrusting and cupping breast. Encouraged skin to skin and frequent attempts at breast to stimulate milk production. Instructed on normal infant behavior in the first 12-24 hours and importance of stimulating the baby frequently to eat during this time. Reviewed hand expression, and encouraged to hand express drops of colostrum when baby is sleepy. Instructed that baby may also feed 8-12 times a day- cluster feeding at times- as her milk supply is being established. Instructed on benefits of skin to skin and avoidance of pacifier / artificial nipple use until breastfeeding is well established. Educated on making sure infant has an open airway while breastfeeding and skin to skin. Instructed on hunger cues and waking techniques to try. Reviewed signs of adequate I & O; allow baby to feed ad jayson and not to limit time at breast. Breastfeeding booklet provided with review of its contents. Encouraged to call with any concerns. none

## 2023-04-12 NOTE — OB PROVIDER IHI INDUCTION/AUGMENTATION NOTE - LABOR: CERVICAL EFFACEMENT
Social Determinants of Health     Intimate Partner Violence: Not At Risk   • Social Determinants: Intimate Partner Violence Past Fear: No   • Social Determinants: Intimate Partner Violence Current Fear: No   Social Connections: Socially Integrated   • Social Determinants: Social Connections: 3 to 5 times a week   Alcohol Use: Not At Risk   • Social Determinants: Total Audit-C Score: 0   Tobacco Use: Low Risk    • Smoking Tobacco Use: Never   • Smokeless Tobacco Use: Never   • Passive Exposure: Not on file   Financial Resource Strain: Low Risk    • Social Determinants: Financial Resource Strain: None   Stress: Low Risk    • Social Determinants: Stress: A little bit   Physical Activity: Insufficiently Active   • Days of Exercise per Week: 3 days   • Minutes of Exercise per Session: 20 min   Food Insecurity: No Food Insecurity   • Social Determinants: Food Insecurity: Never   Transportation Needs: No Transportation Needs   • Lack of Transportation (Medical): No   • Lack of Transportation (Non-Medical): No   Inadequate Housing: Low Risk    • Social Determinants: Housing (Overall Score Dolores) : 2   Depression: Not at risk   • PHQ-2 Score: 1   Postpartum Depression: Not on file         Recent Review Flowsheet Data     Date 4/12/2023    Adult PHQ 2 Score 1    Adult PHQ 2 Interpretation No further screening needed    Little interest or pleasure in activity? Several days    Feeling down, depressed or hopeless? Not at all    Adult PHQ 9 Score 2    Adult PHQ 9 Interpretation Minimal Depression    Trouble falling or staying asleep or sleeping all the time? Not at all    Feeling tired or having little energy? Several days    Poor appetite or overeating? Not at all    Feeling bad about yourself or that you are a failure or have let yourself or family down? Not at all    Trouble concentrating on things such as reading the newspaper or watching TV? Not at all    Moving or speaking slowly that other people have noticed or the  opposite - being so fidgety or restless that you have been moving around a lot more than usual? Not at all    Thoughts that you would be better off dead or of hurting yourself in some way? Not at all    If you reported any problems, how difficult have these problems made it to do your work, take care of things at home, or get along with other people? Not difficult at all           50-74%

## 2023-07-26 NOTE — OB PROVIDER H&P - HISTORY OF PRESENT ILLNESS
Patient's  Jez is calling requesting a call from the nurse in Dr Farmer office. His wife Magdalena is still having the burning and was seen on 7/18/23 by the . She does have an appt on 8/29/23 at [facility]. Please call to advise  
Spoke with Magdalena Story's . Patient states burning is still present from office visit with Dr. Farmer on 7/18. Denies worse burning. Using medication that was prescribed as directed per . Only been using a little over a week. Recommended continue using medication as prescribed and if gets worse or does not get better call office back, but also reiterated, Dr. Farmer would re-evaluate in a month. Patient and  in agreement with plan.  
's patient is a 37 y/o EDC 3/20/22 EGA 38   reports of painful contractions. Denies loss of fluid, vaginal bleeding.     AP complications: Denies  GBS status: positive as per patient  Medical History: Hypothyroidism, Synthroid  Surgical History: Denies  OBGYN History:  7/10/12  Male 7-8  17  Female 6-8  8/15/2020  Female 6-8

## 2024-02-19 NOTE — OB RN TRIAGE NOTE - PATIENT ON (OXYGEN DELIVERY METHOD)
[facility] - [place]  INTAKE FORM  Date of referral: 24   Patient Name: Celsa Newby   : 2013   MRN: [id number]   Phone: [phone]   Insurance Provider: N/A   Insurance ID #: [id number]    Insurance Group #: N/A     WT (kg):   Wt Readings from Last 1 Encounters:   02/15/24 24.7 kg (54 lb 7.3 oz) (37 %, Z= -0.34)*     * Growth percentiles are based on CDC (Girls, 2-20 Years) data.             HT (cm):   Ht Readings from Last 1 Encounters:   02/15/24 4' 0.2\" (1.224 m) (14 %, Z= -1.06)*     * Growth percentiles are based on CDC (Girls, 2-20 Years) data.       Diagnosis: There is no problem list on file for this patient.       ICD 10 CODE: K51.011  Last Infusion: New     Allergies:   ALLERGIES:  No Known Allergies     Please fax this form along with the order, any labs, pre-meds, and   nursing orders that are required for the infusion.     Medication: Avsola    Dose:  10 mg/kg    Frequency     Induction: 0, 2, and 6 weeks  Maintenance: 8 weeks      Premedications needed:     [x] None    Acetaminophen            Dosing: []10 mg/kg                         []15 mg/kg                         [] Other: mg    Diphenhydramine            Dosing: []1 mg/kg                         [] Other: mg    IV Steroids: Hydrocortisone             Dosing: []100 mg                          [] Other: mg                       Methylprednisolone             Dosing: []40 mg                          [] Other: mg              Previous Therapies: balsalazide, prednisone               Referring Provider: [facility] Provider: Dr. Reynold Almaraz     Address: [facility]:  [address] [place] [id number]      Gary Otero RN     [facility]   PHONE: [phone]  FAX: [phone]          
room air

## 2024-04-29 ENCOUNTER — APPOINTMENT (OUTPATIENT)
Dept: OBGYN | Facility: CLINIC | Age: 39
End: 2024-04-29

## 2024-05-22 ENCOUNTER — APPOINTMENT (OUTPATIENT)
Dept: OBGYN | Facility: CLINIC | Age: 39
End: 2024-05-22
Payer: COMMERCIAL

## 2024-05-22 VITALS
BODY MASS INDEX: 26.12 KG/M2 | WEIGHT: 153 LBS | HEIGHT: 64 IN | DIASTOLIC BLOOD PRESSURE: 60 MMHG | SYSTOLIC BLOOD PRESSURE: 108 MMHG

## 2024-05-22 DIAGNOSIS — N81.6 CYSTOCELE, UNSPECIFIED: ICD-10-CM

## 2024-05-22 DIAGNOSIS — Z80.41 FAMILY HISTORY OF MALIGNANT NEOPLASM OF OVARY: ICD-10-CM

## 2024-05-22 DIAGNOSIS — G43.909 MIGRAINE, UNSPECIFIED, NOT INTRACTABLE, W/OUT STATUS MIGRAINOSUS: ICD-10-CM

## 2024-05-22 DIAGNOSIS — Z82.61 FAMILY HISTORY OF ARTHRITIS: ICD-10-CM

## 2024-05-22 DIAGNOSIS — Z83.438 FAMILY HISTORY OF OTHER DISORDER OF LIPOPROTEIN METABOLISM AND OTHER LIPIDEMIA: ICD-10-CM

## 2024-05-22 DIAGNOSIS — Z83.3 FAMILY HISTORY OF DIABETES MELLITUS: ICD-10-CM

## 2024-05-22 DIAGNOSIS — Z00.00 ENCOUNTER FOR GENERAL ADULT MEDICAL EXAMINATION W/OUT ABNORMAL FINDINGS: ICD-10-CM

## 2024-05-22 DIAGNOSIS — Z83.49 FAMILY HISTORY OF OTHER ENDOCRINE, NUTRITIONAL AND METABOLIC DISEASES: ICD-10-CM

## 2024-05-22 DIAGNOSIS — Z82.49 FAMILY HISTORY OF ISCHEMIC HEART DISEASE AND OTHER DISEASES OF THE CIRCULATORY SYSTEM: ICD-10-CM

## 2024-05-22 DIAGNOSIS — N81.10 CYSTOCELE, UNSPECIFIED: ICD-10-CM

## 2024-05-22 DIAGNOSIS — Z01.411 ENCOUNTER FOR GYNECOLOGICAL EXAMINATION (GENERAL) (ROUTINE) WITH ABNORMAL FINDINGS: ICD-10-CM

## 2024-05-22 DIAGNOSIS — N81.6 RECTOCELE: ICD-10-CM

## 2024-05-22 PROCEDURE — 99385 PREV VISIT NEW AGE 18-39: CPT

## 2024-05-22 RX ORDER — FERROUS FUMARATE 324(106)MG
324 (106 FE) TABLET ORAL
Refills: 0 | Status: DISCONTINUED | COMMUNITY
End: 2024-05-22

## 2024-05-23 NOTE — OB RN PATIENT PROFILE - NS_PLACENTAPOSESS_OBGYN_ALL_OB
-- DO NOT REPLY / DO NOT REPLY ALL --  -- This inbox is not monitored  -- Message is from Engagement Center Operations (ECO) --    General Patient Message: matt is calling to verify cancellation on Vitamin C.    Caller Information         Type Contact Phone/Fax    05/23/2024 10:59 AM CDT Phone (Incoming) [facility] - [address] (Pharmacy) [phone]     matt            Alternative phone number: [phone]    Can a detailed message be left? No      Patient has been advised the message will be addressed within 2-3 business days.            
See other encounter, pt to take medication as prescribed per Dr Marie Carlisle  
No

## 2024-05-28 LAB — CYTOLOGY CVX/VAG DOC THIN PREP: NORMAL

## 2024-05-30 PROBLEM — Z83.49 FAMILY HISTORY OF HYPOTHYROIDISM: Status: ACTIVE | Noted: 2024-05-22

## 2024-05-30 PROBLEM — Z82.49 FAMILY HISTORY OF HYPERTENSION: Status: ACTIVE | Noted: 2024-05-22

## 2024-05-30 PROBLEM — Z83.3 FAMILY HISTORY OF TYPE 2 DIABETES MELLITUS: Status: ACTIVE | Noted: 2024-05-22

## 2024-05-30 PROBLEM — G43.909 MIGRAINES: Status: RESOLVED | Noted: 2024-05-22 | Resolved: 2024-05-30

## 2024-05-30 PROBLEM — Z82.61 FAMILY HISTORY OF RHEUMATOID ARTHRITIS: Status: ACTIVE | Noted: 2024-05-22

## 2024-05-30 PROBLEM — Z80.41 FAMILY HISTORY OF MALIGNANT NEOPLASM OF OVARY: Status: ACTIVE | Noted: 2024-05-22

## 2024-05-30 PROBLEM — Z83.438 FAMILY HISTORY OF HYPERLIPIDEMIA: Status: ACTIVE | Noted: 2024-05-22

## 2024-05-30 NOTE — PHYSICAL EXAM
[TextEntry] : Constitutional: Awake, alert, and no acute distress  Breast: No breast mass, no tenderness, no nipple discharge and no axillary lymphadenopathy Abdomen: Soft and nontender Neurologic: Oriented to person, time and place Genitourinary: No abnormalities of the external genitalia, cervix and uterus. The adnexa were not tender and not enlarged. Vagina: rectocele and mild cystocele

## 2024-05-30 NOTE — PLAN
[FreeTextEntry1] : Recommend continuing Kegels Will give rx for pelvic PT Advised to call if periods become heavy or irregular  Family hx of ovarian cancer and hereditary cancer gene screening not discussed at visit  RN will call pt. to f/u

## 2024-05-30 NOTE — HISTORY OF PRESENT ILLNESS
[FreeTextEntry1] : 37 yo  presents for initial preventative exam. She c/o possible vaginal prolapse. She states after her 4th baby she developed COVID and during that time felt something protruding from her vagina. She denies incontinence and states she has been doing Kegel exercises. Periods are monthly lasting x7d. Only heavy days 4 and 5 otherwise very light. Using condoms for BC and does not desire future pregnancy. Last pap -no hx of abnl paps.  OB hx: NSD x4  BRCA questionnaire- no increased risk to warrant testing. Maternal aunt ovarian cancer

## 2024-06-27 NOTE — OB PROVIDER TRIAGE NOTE - NS_TRIAGEMEDICALSCREENINGPERFORMEDBY_OBGYN_ALL_OB_FT
IV in place, unable to call for report x2. Paperwork sent with patient. Small report given to BLS. All questions answered.   
SALVADOR Hobson, NP

## 2024-07-23 NOTE — OB RN PATIENT PROFILE - NSWEIGHTCALCTOOLDRUG_GEN_A_CORE
unremarkable.  The adrenal glands are normal  bilaterally.  Noncontrast images of the kidneys and ureters demonstrate no  evidence of a renal or ureteral calculus or hydronephrosis.  The bilateral  kidneys are unremarkable in noncontrast appearance.    GI/Bowel: Evaluation of the hollow GI tract demonstrates no evidence of  abnormal bowel wall thickening, dilatation, or obstruction.  The appendix is  normal.  There is colonic diverticulosis, without evidence of diverticulitis.    Pelvis: The uterus appears surgically absent.  The left adnexa is  unremarkable.  The urinary bladder is unremarkable.  There is a metallic clip  noted just left lateral to the urinary bladder.  Scattered phleboliths are  noted.  There is no free pelvic fluid.  No pathologic pelvic lymphadenopathy  is identified.    Peritoneum/Retroperitoneum: No intraperitoneal free air or free fluid is  identified.  No pathologic lymphadenopathy is seen.  There is atherosclerotic  disease of the abdominal aorta, without evidence of aneurysm.  There is a  small and likely insignificant fat containing umbilical hernia noted without  incarceration or bowel involvement.    Bones/Soft Tissues: There is mild degenerative change throughout the  visualized thoracolumbar spine, with a degenerative anterolisthesis of L5 on  S1.  There is bilateral hip osteoarthritis.  No osteolytic or osteoblastic  lesion is identified.    Impression  1. No acute process within the chest, abdomen, or pelvis.  2. No pathologic lymphadenopathy within the chest, abdomen, or pelvis.  3. Small nodular opacities adherent to the anterior and lateral walls of the  trachea, possibly either retained secretions or tracheal polyps.  Suggest  clinical correlation, and consider further characterization with  bronchoscopy, if clinically indicated.  4. Mild biapical emphysema and diffuse bronchiectasis.  5. Subtle layering high attenuation within the gallbladder, possibly  gallstones or   used

## 2024-08-02 NOTE — OB PROVIDER DELIVERY SUMMARY - NSCOUNTCORRECT_OBGYN_ALL_OB
[Clinical] : TNM Stage: c [I] : I [TTNM] : 1b [NTNM] : 0 [MTNM] : 0 [de-identified] : 2500 cGy [de-identified] : 5000 cGy [de-identified] : left lung Laps, needles and instruments count was reported as correct.